# Patient Record
Sex: MALE
[De-identification: names, ages, dates, MRNs, and addresses within clinical notes are randomized per-mention and may not be internally consistent; named-entity substitution may affect disease eponyms.]

---

## 2017-02-01 ENCOUNTER — APPOINTMENT (OUTPATIENT)
Dept: OTOLARYNGOLOGY | Facility: CLINIC | Age: 27
End: 2017-02-01

## 2019-07-23 ENCOUNTER — APPOINTMENT (OUTPATIENT)
Dept: OTOLARYNGOLOGY | Facility: CLINIC | Age: 29
End: 2019-07-23
Payer: COMMERCIAL

## 2019-07-23 VITALS
DIASTOLIC BLOOD PRESSURE: 74 MMHG | HEART RATE: 71 BPM | WEIGHT: 130 LBS | SYSTOLIC BLOOD PRESSURE: 120 MMHG | HEIGHT: 67 IN | BODY MASS INDEX: 20.4 KG/M2

## 2019-07-23 DIAGNOSIS — Z78.9 OTHER SPECIFIED HEALTH STATUS: ICD-10-CM

## 2019-07-23 PROCEDURE — 31231 NASAL ENDOSCOPY DX: CPT

## 2019-07-23 PROCEDURE — 99204 OFFICE O/P NEW MOD 45 MIN: CPT | Mod: 25

## 2019-07-23 NOTE — HISTORY OF PRESENT ILLNESS
[de-identified] : PAtient has had recurrent sinus infections over the last few years. He has frequently been on antibiotics over the years and once he getw sick he has issues getting healthy again as he cannot get rid of mucus. He gets pressure in the face all the time especially on the left. He wakes up at night gasping for air as he cannot breathe through his nose. About 6 months ago everything was worse where he had pain in the teeth on the upper left isde and his left side of his face was severely dull aching pressure. He was taking tylenol and advil all the time with no benefit at that time. That severe pressure has improved but he still cant sleep at night as a result. he has used medicated nasal sprays over the last few months with minor benefit if any. he thinsk he has seasonal allergies as well btu has never been tested. He wkes up each morning and has to blow his nose frequently. He has frequent sore throat as a result of mout breathing but by the end of the day it is better.

## 2019-07-23 NOTE — REVIEW OF SYSTEMS
[Nasal Congestion] : nasal congestion [Sinus Pressure] : sinus pressure [Sinus Pain] : sinus pain [Negative] : Endocrine

## 2019-07-23 NOTE — ASSESSMENT
[FreeTextEntry1] : Patient a long history of recurrent left sided sinus pain and facial pain been seen by another otolaryngologist CAT scan was performed and reviewed shows total opacification of his left maxillary sinus as well as a severely deviated nasal septum. Patient is strongly indicated for intervention sinus surgery and septoplasty turbinoplasty all with image guided system this and benefits were discussed wants to proceed in the near future. Some consideration for possible rhinoplasty was also given understands that that be an adequate pocket expense for that. Patient wants to consider proceeding with a patulous we'll get the oral and try to get him approved by his insurance company.

## 2019-10-27 ENCOUNTER — MOBILE ON CALL (OUTPATIENT)
Age: 29
End: 2019-10-27

## 2019-10-28 ENCOUNTER — OUTPATIENT (OUTPATIENT)
Dept: OUTPATIENT SERVICES | Facility: HOSPITAL | Age: 29
LOS: 1 days | End: 2019-10-28

## 2019-10-28 VITALS
OXYGEN SATURATION: 99 % | TEMPERATURE: 97 F | DIASTOLIC BLOOD PRESSURE: 64 MMHG | WEIGHT: 128.09 LBS | RESPIRATION RATE: 16 BRPM | HEIGHT: 66.5 IN | HEART RATE: 62 BPM | SYSTOLIC BLOOD PRESSURE: 110 MMHG

## 2019-10-28 DIAGNOSIS — Z98.890 OTHER SPECIFIED POSTPROCEDURAL STATES: Chronic | ICD-10-CM

## 2019-10-28 DIAGNOSIS — J32.0 CHRONIC MAXILLARY SINUSITIS: ICD-10-CM

## 2019-10-28 LAB
ALBUMIN SERPL ELPH-MCNC: 4.9 G/DL — SIGNIFICANT CHANGE UP (ref 3.3–5)
ALP SERPL-CCNC: 58 U/L — SIGNIFICANT CHANGE UP (ref 40–120)
ALT FLD-CCNC: 20 U/L — SIGNIFICANT CHANGE UP (ref 4–41)
ANION GAP SERPL CALC-SCNC: 15 MMO/L — HIGH (ref 7–14)
AST SERPL-CCNC: 28 U/L — SIGNIFICANT CHANGE UP (ref 4–40)
BILIRUB SERPL-MCNC: 2.5 MG/DL — HIGH (ref 0.2–1.2)
BUN SERPL-MCNC: 8 MG/DL — SIGNIFICANT CHANGE UP (ref 7–23)
CALCIUM SERPL-MCNC: 10.3 MG/DL — SIGNIFICANT CHANGE UP (ref 8.4–10.5)
CHLORIDE SERPL-SCNC: 101 MMOL/L — SIGNIFICANT CHANGE UP (ref 98–107)
CO2 SERPL-SCNC: 27 MMOL/L — SIGNIFICANT CHANGE UP (ref 22–31)
CREAT SERPL-MCNC: 0.82 MG/DL — SIGNIFICANT CHANGE UP (ref 0.5–1.3)
GLUCOSE SERPL-MCNC: 82 MG/DL — SIGNIFICANT CHANGE UP (ref 70–99)
HCT VFR BLD CALC: 50.1 % — HIGH (ref 39–50)
HGB BLD-MCNC: 16.5 G/DL — SIGNIFICANT CHANGE UP (ref 13–17)
MCHC RBC-ENTMCNC: 28.1 PG — SIGNIFICANT CHANGE UP (ref 27–34)
MCHC RBC-ENTMCNC: 32.9 % — SIGNIFICANT CHANGE UP (ref 32–36)
MCV RBC AUTO: 85.2 FL — SIGNIFICANT CHANGE UP (ref 80–100)
NRBC # FLD: 0 K/UL — SIGNIFICANT CHANGE UP (ref 0–0)
PLATELET # BLD AUTO: 266 K/UL — SIGNIFICANT CHANGE UP (ref 150–400)
PMV BLD: 10.6 FL — SIGNIFICANT CHANGE UP (ref 7–13)
POTASSIUM SERPL-MCNC: 4.5 MMOL/L — SIGNIFICANT CHANGE UP (ref 3.5–5.3)
POTASSIUM SERPL-SCNC: 4.5 MMOL/L — SIGNIFICANT CHANGE UP (ref 3.5–5.3)
PROT SERPL-MCNC: 7.7 G/DL — SIGNIFICANT CHANGE UP (ref 6–8.3)
RBC # BLD: 5.88 M/UL — HIGH (ref 4.2–5.8)
RBC # FLD: 12.5 % — SIGNIFICANT CHANGE UP (ref 10.3–14.5)
SODIUM SERPL-SCNC: 143 MMOL/L — SIGNIFICANT CHANGE UP (ref 135–145)
WBC # BLD: 5.49 K/UL — SIGNIFICANT CHANGE UP (ref 3.8–10.5)
WBC # FLD AUTO: 5.49 K/UL — SIGNIFICANT CHANGE UP (ref 3.8–10.5)

## 2019-10-28 NOTE — H&P PST ADULT - MUSCULOSKELETAL
details… normal strength/ROM intact/no joint swelling/no joint erythema/no joint warmth/no calf tenderness detailed exam

## 2019-10-28 NOTE — H&P PST ADULT - NSICDXPROBLEM_GEN_ALL_CORE_FT
PROBLEM DIAGNOSES  Problem: Chronic maxillary sinusitis  Assessment and Plan: Pt scheduled for surgery on 11/6/19.   Pre-op instructions provided. Pt verbalized understanding.   Pepcid provided for GI prophylaxis.

## 2019-10-28 NOTE — H&P PST ADULT - NSICDXPASTSURGICALHX_GEN_ALL_CORE_FT
PAST SURGICAL HISTORY:  H/O inguinal hernia repair bilateral 1990    History of strabismus surgery 2008, 2017

## 2019-10-28 NOTE — H&P PST ADULT - NSICDXFAMILYHX_GEN_ALL_CORE_FT
FAMILY HISTORY:  Father  Still living? Unknown  FH: HTN (hypertension), Age at diagnosis: Age Unknown

## 2019-10-28 NOTE — H&P PST ADULT - HISTORY OF PRESENT ILLNESS
29 year old male with c/o chronic sinusitis presents today for presurgical evaluation for ... 29 year old male with c/o chronic sinusitis presents today for presurgical evaluation for Septoplasty, Bilateral Turbinoplasty, Functional Endoscopic Sinus Surgery (Bilateral Maxillary, Bilateral Ethmoid) scheduled on 11/6/19.

## 2019-10-29 ENCOUNTER — APPOINTMENT (OUTPATIENT)
Dept: OTOLARYNGOLOGY | Facility: CLINIC | Age: 29
End: 2019-10-29
Payer: COMMERCIAL

## 2019-10-29 VITALS
WEIGHT: 130 LBS | DIASTOLIC BLOOD PRESSURE: 66 MMHG | HEART RATE: 66 BPM | HEIGHT: 67 IN | SYSTOLIC BLOOD PRESSURE: 120 MMHG | BODY MASS INDEX: 20.4 KG/M2

## 2019-10-29 DIAGNOSIS — J32.0 CHRONIC MAXILLARY SINUSITIS: ICD-10-CM

## 2019-10-29 PROCEDURE — 99214 OFFICE O/P EST MOD 30 MIN: CPT | Mod: 25

## 2019-10-29 PROCEDURE — 31231 NASAL ENDOSCOPY DX: CPT

## 2019-10-29 NOTE — PHYSICAL EXAM
[Nasal Endoscopy Performed] : nasal endoscopy was performed, see procedure section for findings [Midline] : trachea located in midline position [Normal] : no rashes [] : septum deviated to the right [de-identified] : deflection of the nasal bridge to the right

## 2019-10-29 NOTE — HISTORY OF PRESENT ILLNESS
[de-identified] : PAtient was just in [place] for about 3 weeks and has been doing well. He has been doing the flonase since the last visit. he is due for his surgery in 2 weeks. He pierson snot have any sinus pressure or recent need for antibiotics. He does not think that he has any seasonal allergies. He does not have any current ear pressure  or pain.

## 2019-10-29 NOTE — ASSESSMENT
[FreeTextEntry1] : Patient scheduled for a separate stitches coming week here for reevaluation endoscopically is deviated septum was once again confirmed is also considering the possibility of some change in the appearance of his nose specifically to straighten out his nose and possibly a small graft to camouflage an irregularity of the distal left lower lateral cartilage. He understands his an out-of-pocket expense if he wants to do that photos were taken the wound let us known prior to surgery on November 6.

## 2019-11-04 ENCOUNTER — MEDICATION RENEWAL (OUTPATIENT)
Age: 29
End: 2019-11-04

## 2019-11-04 RX ORDER — ACETAMINOPHEN AND CODEINE 300; 30 MG/1; MG/1
300-30 TABLET ORAL EVERY 6 HOURS
Qty: 20 | Refills: 0 | Status: ACTIVE | COMMUNITY
Start: 2019-11-04 | End: 1900-01-01

## 2019-11-04 RX ORDER — AZITHROMYCIN 250 MG/1
250 TABLET, FILM COATED ORAL
Qty: 1 | Refills: 0 | Status: ACTIVE | COMMUNITY
Start: 2019-11-04 | End: 1900-01-01

## 2019-11-05 ENCOUNTER — TRANSCRIPTION ENCOUNTER (OUTPATIENT)
Age: 29
End: 2019-11-05

## 2019-11-06 ENCOUNTER — OUTPATIENT (OUTPATIENT)
Dept: OUTPATIENT SERVICES | Facility: HOSPITAL | Age: 29
LOS: 1 days | Discharge: HOME | End: 2019-11-06
Payer: COMMERCIAL

## 2019-11-06 ENCOUNTER — APPOINTMENT (OUTPATIENT)
Dept: OTOLARYNGOLOGY | Facility: AMBULATORY SURGERY CENTER | Age: 29
End: 2019-11-06

## 2019-11-06 ENCOUNTER — RESULT REVIEW (OUTPATIENT)
Age: 29
End: 2019-11-06

## 2019-11-06 VITALS
SYSTOLIC BLOOD PRESSURE: 120 MMHG | HEART RATE: 76 BPM | DIASTOLIC BLOOD PRESSURE: 76 MMHG | OXYGEN SATURATION: 100 % | WEIGHT: 128.09 LBS | RESPIRATION RATE: 16 BRPM | HEIGHT: 66.5 IN | TEMPERATURE: 98 F

## 2019-11-06 VITALS
TEMPERATURE: 98 F | HEART RATE: 83 BPM | DIASTOLIC BLOOD PRESSURE: 90 MMHG | OXYGEN SATURATION: 99 % | RESPIRATION RATE: 16 BRPM | SYSTOLIC BLOOD PRESSURE: 144 MMHG

## 2019-11-06 DIAGNOSIS — J32.0 CHRONIC MAXILLARY SINUSITIS: ICD-10-CM

## 2019-11-06 DIAGNOSIS — Z98.890 OTHER SPECIFIED POSTPROCEDURAL STATES: Chronic | ICD-10-CM

## 2019-11-06 PROCEDURE — 88304 TISSUE EXAM BY PATHOLOGIST: CPT | Mod: 26

## 2019-11-06 PROCEDURE — 88305 TISSUE EXAM BY PATHOLOGIST: CPT | Mod: 26

## 2019-11-06 PROCEDURE — 88311 DECALCIFY TISSUE: CPT | Mod: 26

## 2019-11-06 PROCEDURE — 31267 ENDOSCOPY MAXILLARY SINUS: CPT | Mod: 50,59

## 2019-11-06 PROCEDURE — 30140 RESECT INFERIOR TURBINATE: CPT | Mod: 50,59

## 2019-11-06 PROCEDURE — 30520 REPAIR OF NASAL SEPTUM: CPT | Mod: 59

## 2019-11-06 PROCEDURE — 31255 NSL/SINS NDSC W/TOT ETHMDCT: CPT | Mod: 50

## 2019-11-06 PROCEDURE — 61782 SCAN PROC CRANIAL EXTRA: CPT

## 2019-11-06 RX ORDER — AZITHROMYCIN 500 MG/1
1 TABLET, FILM COATED ORAL
Qty: 6 | Refills: 0
Start: 2019-11-06 | End: 2019-11-10

## 2019-11-06 RX ORDER — ACETAMINOPHEN WITH CODEINE 300MG-30MG
1 TABLET ORAL
Qty: 20 | Refills: 0
Start: 2019-11-06 | End: 2019-11-10

## 2019-11-06 NOTE — ASU DISCHARGE PLAN (ADULT/PEDIATRIC) - ASU DC SPECIAL INSTRUCTIONSFT
Slow stream during urinating can be normal especially in males after general anesthesia. Complete inability to urinate is not normal, however, and you should alert your doctor as soon as possible.

## 2019-11-06 NOTE — ASU DISCHARGE PLAN (ADULT/PEDIATRIC) - CALL YOUR DOCTOR IF YOU HAVE ANY OF THE FOLLOWING:
Bleeding that does not stop/Fever greater than (need to indicate Fahrenheit or Celsius)/Unable to urinate

## 2019-11-06 NOTE — ASU PREOPERATIVE ASSESSMENT, ADULT (IPARK ONLY) - PERIPHERAL IV: INSERTION DATE
Left message for patient to call clinic, please give message from Sarah and Specialty Scheduling number [phone] for OB/Gyn appointment.    Fauzia Farmer XRO/     06-Nov-2019

## 2019-11-06 NOTE — ASU DISCHARGE PLAN (ADULT/PEDIATRIC) - CARE PROVIDER_API CALL
Dmitriy Stokes (MD)  Facial Plastic and Reconstructive Surgery; Otolaryngology  [address] [place] [address], [facility] [address]  [place] [address]  Phone: [phone]  Fax: [phone]  Follow Up Time:

## 2019-11-07 ENCOUNTER — APPOINTMENT (OUTPATIENT)
Dept: OTOLARYNGOLOGY | Facility: CLINIC | Age: 29
End: 2019-11-07
Payer: COMMERCIAL

## 2019-11-07 VITALS
DIASTOLIC BLOOD PRESSURE: 72 MMHG | SYSTOLIC BLOOD PRESSURE: 120 MMHG | HEART RATE: 96 BPM | WEIGHT: 130 LBS | HEIGHT: 67 IN | BODY MASS INDEX: 20.4 KG/M2

## 2019-11-07 PROCEDURE — 31237 NSL/SINS NDSC SURG BX POLYPC: CPT | Mod: 50,58

## 2019-11-07 PROCEDURE — 99024 POSTOP FOLLOW-UP VISIT: CPT

## 2019-11-07 NOTE — PHYSICAL EXAM
[Nasal Endoscopy Performed] : nasal endoscopy was performed, see procedure section for findings [Midline] : trachea located in midline position [Normal] : orientation to person, place, and time: normal [de-identified] : cast intact over the nasal bridge

## 2019-11-07 NOTE — PHYSICAL EXAM
[Nasal Endoscopy Performed] : nasal endoscopy was performed, see procedure section for findings [Midline] : trachea located in midline position [Normal] : orientation to person, place, and time: normal [de-identified] : cast intact over the nasal bridge

## 2019-11-07 NOTE — HISTORY OF PRESENT ILLNESS
[de-identified] : PAtient si 1 day s/p septopalsty and FESS and is having moderate nasal pressure bilaterally. He was bleeding overnight and had to  change the dressing several times overnight. he does not have any issues with fevers or chills. He started the antibitoics last night with some soup. He did take the pain medication as well for pain control

## 2019-11-07 NOTE — HISTORY OF PRESENT ILLNESS
[de-identified] : PAtient si 1 day s/p septopalsty and FESS and is having moderate nasal pressure bilaterally. He was bleeding overnight and had to  change the dressing several times overnight. he does not have any issues with fevers or chills. He started the antibitoics last night with some soup. He did take the pain medication as well for pain control

## 2019-11-11 ENCOUNTER — APPOINTMENT (OUTPATIENT)
Dept: OTOLARYNGOLOGY | Facility: CLINIC | Age: 29
End: 2019-11-11
Payer: COMMERCIAL

## 2019-11-11 VITALS
HEIGHT: 67 IN | SYSTOLIC BLOOD PRESSURE: 114 MMHG | BODY MASS INDEX: 20.4 KG/M2 | WEIGHT: 130 LBS | DIASTOLIC BLOOD PRESSURE: 73 MMHG | HEART RATE: 100 BPM

## 2019-11-11 DIAGNOSIS — J34.2 DEVIATED NASAL SEPTUM: ICD-10-CM

## 2019-11-11 PROCEDURE — 99024 POSTOP FOLLOW-UP VISIT: CPT

## 2019-11-11 RX ORDER — MOMETASONE FUROATE 1 MG/G
0.1 CREAM TOPICAL
Qty: 45 | Refills: 0 | Status: ACTIVE | COMMUNITY
Start: 2019-09-11

## 2019-11-11 RX ORDER — ERYTHROMYCIN 20 MG/G
2 GEL TOPICAL
Qty: 30 | Refills: 0 | Status: ACTIVE | COMMUNITY
Start: 2019-09-17

## 2019-11-11 NOTE — HISTORY OF PRESENT ILLNESS
[de-identified] : 30yo male s/p rhinoplasty/septo/FESS on 11/6 here for eval secondary to right nasal bleeding over weekend with significant nasal obstruction and pressure. unable to sleep bc cannot breathe through nose and feeling pressure.

## 2019-11-11 NOTE — PHYSICAL EXAM
[de-identified] : bloody secretions and clot filling right nasal passage - this was suctioned clear and afrin sprayed bilaterally; left side with edema but right able to be cleared so patient could breathe [de-identified] : splint in place

## 2019-11-11 NOTE — ASSESSMENT
[FreeTextEntry1] : s/p rhinoplasty/septo/turbs:\par - some self-limited right bleeding over weekend but with significant nasal congestion that was addressed today by suctioning out nasal passage\par - pt more comfortable; advised can use sudafed if he feels increased pressure\par - cont nasal saline spray\par - f/u with Dr. Stokes tomorrow as scheduled

## 2019-11-12 ENCOUNTER — APPOINTMENT (OUTPATIENT)
Dept: OTOLARYNGOLOGY | Facility: CLINIC | Age: 29
End: 2019-11-12
Payer: COMMERCIAL

## 2019-11-12 VITALS
BODY MASS INDEX: 20.4 KG/M2 | WEIGHT: 130 LBS | HEIGHT: 67 IN | DIASTOLIC BLOOD PRESSURE: 69 MMHG | SYSTOLIC BLOOD PRESSURE: 121 MMHG | HEART RATE: 84 BPM

## 2019-11-12 LAB — SURGICAL PATHOLOGY STUDY: SIGNIFICANT CHANGE UP

## 2019-11-12 PROCEDURE — 31237 NSL/SINS NDSC SURG BX POLYPC: CPT | Mod: 50,58

## 2019-11-12 PROCEDURE — 99024 POSTOP FOLLOW-UP VISIT: CPT

## 2019-11-12 NOTE — ASSESSMENT
[FreeTextEntry1] : patient follows up 5 days status post septoplasty with turbs and rhino. Patient has finished taking the antibiotics as prescribed and is taking pain medication as needed. nasal splints were removed from the bilateral nasal cavities without issue. Patient was debrided bilaterally with rigid suction as a result of nasal crusting. Tip dressing was replaced and should be changed as needed until followup. Patient should continue to avoid nose blowing, heavy lifting or exercising, and should continue a regimen of over the counter nasal saline spray for moisturization of the nasal cavities. Patient will followup in 1 week for further debridement.

## 2019-11-12 NOTE — HISTORY OF PRESENT ILLNESS
[de-identified] : Patient is about 5 days s/p septoplasty and FESS and is feeling better today. He ended up coming in yesterday to be suctioned out as a result of having severe nasal congestion and crusting. he does not have any facial pressure or pain and is not having any issues with bleeding for the lsat few days. He finished the antibiotics as prescribed

## 2019-11-21 ENCOUNTER — APPOINTMENT (OUTPATIENT)
Dept: OTOLARYNGOLOGY | Facility: CLINIC | Age: 29
End: 2019-11-21
Payer: COMMERCIAL

## 2019-11-21 VITALS
BODY MASS INDEX: 20.4 KG/M2 | HEIGHT: 67 IN | DIASTOLIC BLOOD PRESSURE: 76 MMHG | WEIGHT: 130 LBS | SYSTOLIC BLOOD PRESSURE: 123 MMHG | HEART RATE: 72 BPM

## 2019-11-21 PROCEDURE — 99024 POSTOP FOLLOW-UP VISIT: CPT

## 2019-11-21 PROCEDURE — 31237 NSL/SINS NDSC SURG BX POLYPC: CPT | Mod: 50,58

## 2019-11-21 NOTE — HISTORY OF PRESENT ILLNESS
[de-identified] : Patient is 2 weeks s/p septoplasty and is feeling like he is still minor congestion on occasion. he does not have any facial pain or pressure and is nto having any issues with bleeding from the nose. He does not have any facial pressure or pain and is overall feeling like there is pressure in the forehead that is moderate. He did take sudafed that helped but then he got very dry.

## 2019-11-21 NOTE — ASSESSMENT
[FreeTextEntry1] : Patient to status post nasal surgery doing great looks great very happy having some difficulty breathing however this is due to crusting which was endoscopically debrided bilaterally resulting in excellent breathing for him he is very happy he will follow up and see us in one month.

## 2019-12-17 ENCOUNTER — APPOINTMENT (OUTPATIENT)
Dept: OTOLARYNGOLOGY | Facility: CLINIC | Age: 29
End: 2019-12-17
Payer: COMMERCIAL

## 2019-12-17 VITALS
WEIGHT: 130 LBS | SYSTOLIC BLOOD PRESSURE: 121 MMHG | DIASTOLIC BLOOD PRESSURE: 69 MMHG | HEART RATE: 85 BPM | HEIGHT: 67 IN | BODY MASS INDEX: 20.4 KG/M2

## 2019-12-17 PROCEDURE — 31237 NSL/SINS NDSC SURG BX POLYPC: CPT | Mod: 50,58

## 2019-12-17 PROCEDURE — 99024 POSTOP FOLLOW-UP VISIT: CPT

## 2019-12-17 NOTE — ASSESSMENT
[FreeTextEntry1] : Patient follows up 6 weeks status post surgery doing great feeling much better no longer has any significant congestion still feel somewhat nasal reassuring with time double resolved feels a little dry of encouraged him to use a YR gel he looked great he is very happy photos were taken and followup and see us in one month. He understands that his left nasal cavity will never be as good as his right nasal cavity and for structural reasons and we were very conservative up in the nasal valve region which he understands his breathing tremendously better and both sides actually and is very happy.

## 2019-12-17 NOTE — HISTORY OF PRESENT ILLNESS
[de-identified] : PAtient is about 6 weeks after his septoplasty and is doing his daily sinus rinses btu still feels nasally congested and dry bilaterally. He has moderate dryness in both nasal cavities but feels like the dryness interrupts his breathing bilaterally. he feels that he sounds nasally when he speaks. He does not have any issues with runny nose bilaterally. he deneis any bleeding from the nose as well

## 2020-01-28 ENCOUNTER — APPOINTMENT (OUTPATIENT)
Dept: OTOLARYNGOLOGY | Facility: CLINIC | Age: 30
End: 2020-01-28
Payer: COMMERCIAL

## 2020-01-28 VITALS
HEIGHT: 67 IN | SYSTOLIC BLOOD PRESSURE: 121 MMHG | HEART RATE: 72 BPM | BODY MASS INDEX: 20.4 KG/M2 | DIASTOLIC BLOOD PRESSURE: 76 MMHG | WEIGHT: 130 LBS

## 2020-01-28 DIAGNOSIS — J34.89 OTHER SPECIFIED DISORDERS OF NOSE AND NASAL SINUSES: ICD-10-CM

## 2020-01-28 DIAGNOSIS — Z98.890 OTHER SPECIFIED POSTPROCEDURAL STATES: ICD-10-CM

## 2020-01-28 PROCEDURE — 99024 POSTOP FOLLOW-UP VISIT: CPT

## 2020-01-28 NOTE — ASSESSMENT
[FreeTextEntry1] : Patient breathing better but still feels more restricted on his left side and is bothering him. On examination there is a high deflection that has persisted his breathing capacity is significantly improved but I feel can be improved more with a revision if she so desires. I told to use Breathe Right see if that will help this so I feel comfortable recommending a revision to be more aggressive in that region. If not not sure that any additional intervention would be of benefit. This was discussed with the patient at length understood he will think about it as well to follow up and see us in 2 months.

## 2020-01-28 NOTE — PHYSICAL EXAM
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

## 2020-01-28 NOTE — HISTORY OF PRESENT ILLNESS
[de-identified] : PAtient is about 2.5 months s/p septoplasty and is overall doing well but still feels like part of his nasal passage is blocked on both sides. WHile he has not had any sinus pressure or runny nose he still feels like this contributes to minor nasal congestion. He does still do the saline but he continues to feel like this obstruction has persisted towards the back. He has also been told since the surgery that his voice sounds "nasal"

## 2021-07-08 NOTE — H&P PST ADULT - RESPIRATORY
Billing Type: Third-Party Bill
Bill For Surgical Tray: no
Performing Laboratory: 0
Expected Date Of Service: 07/08/2021
detailed exam

## 2022-05-23 NOTE — REVIEW OF SYSTEMS
[Nasal Congestion] : nasal congestion [Negative] : Heme/Lymph range of motion is not limited and there is no muscle tenderness.

## 2023-09-25 RX ORDER — IBUPROFEN 200 MG
2 TABLET ORAL
Qty: 0 | Refills: 0 | DISCHARGE

## 2023-09-25 RX ORDER — FLUTICASONE PROPIONATE 50 MCG
1 SPRAY, SUSPENSION NASAL
Qty: 0 | Refills: 0 | DISCHARGE

## 2023-09-25 RX ORDER — ACETAMINOPHEN 500 MG
2 TABLET ORAL
Qty: 0 | Refills: 0 | DISCHARGE

## 2024-02-19 NOTE — H&P PST ADULT - SPO2 (%)
Follow up Endocrinology Visit  Initial consult on: 8/17/23  Last visit on: 1/17/23  Referred by: Stanton Miller M.D.    Patient was presented for a telehealth consultation via secure and encrypted videoconferencing technology.    Location of Provider - office  Location of Patient - home  Patient Consent - yes  Platform used - Zoom  Total time -  min    Chief complaint:  Prabhakar Sierra, 74 y.o., male, who is here for follow up for T2DM management. Here with his wife - Heidy    Interval history:   -   Prior notes:  - overall doing well  - unfortunately, he couldn't tolerate Ozempic - lost his appetite, was very apathic as per patient's wife  - has poor dentition, needs dental work  - no side effects with Jardiance, still in process of getting assistance program for the medication  1/17/23  - had hospitalization in 12/2023 due to weakness, leading to fall, complicated by L1 compression fracture  - today is first day home  - Reclast infusion was delayed due to hospitalization  - in the hospital was given short acting insulin prn, but lately he didn't require one  - concerns of decrease BMD with pitoglitazone  - reviewed most recent labs    Current therapy:  - change dose of Synjardy 25/1000 daily -> 12.5/1000 bid.   - start on Tradjenta 5 mg daily  - stop Pioglitazone  - make sure patient is well hydrated    Trijardy??    Tolerates medications: well  Compliant: yes    Prior used medications:   Rybelsus -> stopped too expensive  Ozempic -> loss of appetite, apathy    Other important medications:  ASA 81  Atorvastatin 80 mg  Benazepril 40 mg  Duloxetine 60 mg  Metoprolol tartrate 25 mg    BG control:  SBGM x 3  FBGs - 183 - 300  Bgs during the day - 73 - 373, majority at mid 100s    Prior:  CGM type - Elena 3  Period - 10/4/23 - 10/17/23   Data were reviewed and discussed with the patient  Active time - 96%  ABG - 185 mg/dl  GV - 19.2%  GMI - 7.7%  TIR - 44%  TAR - high - 52%, very high - 4%  TBR - 0%      Hypoglycemic episodes:  Hypoglycemic symptoms: NA  Hypoglycemic unawareness: NA  Treatment: NA  Severe hypoglycemia: NA  Has medical bracelet/necklace:NA  Has glucagon emergency kit: NA    DM history:  - diagnosed 20+, on blood screen, weight at the time of the diagnosis - 260 lb (BMI = 37.3 kg/m2)   - hospitalizations for DKA/HHS/severe hyperglycemia/severe hypoglycemia in the past: none  - prior therapy:  Rybelsus, Jardiance - stopped due to being expensive, denies any side effects except unpleasant mouth taste with Rybelsus  - known DM complications: peripheral neuropathy  - latest HbA1C 10% in 8/2023  - pertinent PMHx:   -- obesity, dyslipidemia, prostate Cr, s/p multiple routes of treatment, currently in remission, GERD, HTN, depression, ED, alcohol dependence in the remission, Alzheimer's dementia,  NPH, thoracic compression fracture, PSVT  -- denies  CAD/PAD/CHF/ CVA  Hospital admission 12/28/22 - 1/5/23:  - presented with back pain, CT showed T11, L2, L3 compression fractures, he considered to be poor candidate for kyphoplasty -> prolonged rehab recovery  - reports weight loss which he associated with decreased appetite due to not tasty food in hospital and rehab    Current Medications:No current facility-administered medications for this visit.  No current outpatient medications on file.    Facility-Administered Medications Ordered in Other Visits:     LORazepam (Ativan) 2 MG/ML oral conc 0.26-0.5 mg, 0.26-0.5 mg, Oral, Q2HRS PRN, Rin Franklin, A.P.R.N.    senna-docusate (Pericolace Or Senokot S) 8.6-50 MG per tablet 2 Tablet, 2 Tablet, Oral, BID **AND** lactulose 20 GM/30ML solution 30 mL, 30 mL, Oral, Q24HRS PRN **AND** bisacodyl (Dulcolax) suppository 10 mg, 10 mg, Rectal, Q24HRS PRN **AND** sodium phosphate (Fleet) enema 133 mL, 1 Each, Rectal, Once PRN, Rin Franklin A.P.R.N.    acetaminophen (Tylenol) tablet 650 mg, 650 mg, Oral, Q4HRS PRN, TORIE [email or web address]    acetaminophen  (Tylenol) suppository 650 mg, 650 mg, Rectal, Q4HRS PRN, Rin M Whitehorn, A.P.R.N.    ondansetron (Zofran ODT) dispertab 4 mg, 4 mg, Oral, Q6HRS PRN, Rin M Whitehorn, A.P.R.N.    buPROPion (Wellbutrin) tablet 150 mg, 150 mg, Oral, BID, Rin M Whitehorn, A.P.R.N., 150 mg at 02/18/24 2030    DULoxetine (Cymbalta) capsule 60 mg, 60 mg, Oral, BID, Rin M Whitehorn, A.P.R.N., 60 mg at 02/18/24 2028    metoprolol tartrate (Lopressor) tablet 12.5 mg, 12.5 mg, Oral, BID, Rin M Whitehorn, A.P.R.N., 12.5 mg at 02/18/24 2029    omeprazole (PriLOSEC) capsule 20 mg, 20 mg, Oral, BID, Rin M Whitehorn, A.P.R.N., 20 mg at 02/18/24 2029    HYDROcodone-acetaminophen (Norco) 5-325 MG per tablet 1 Tablet, 1 Tablet, Oral, Q4HRS PRN, Rin M Whitehorn, A.P.R.N.    SITagliptin (Januvia) tablet 100 mg, 100 mg, Oral, DAILY, Rin M Whitehorn, A.P.R.N., 100 mg at 02/18/24 1419    metFORMIN (Glucophage) tablet 1,000 mg, 1,000 mg, Oral, BID WITH MEALS, Rin M Whitehorn, A.P.R.N., 1,000 mg at 02/18/24 1751    lidocaine (Asperflex) 4 % patch 1 Patch, 1 Patch, Transdermal, Q24HR, Rin M Whitehorn, A.P.R.N.    PHYSICAL EXAM:   Vital signs: There were no vitals taken for this visit.  GENERAL: Well-developed, well-nourished  in no apparent distress.  Walks with walker. Appropriately answers the questions but most of the history is obtained from his wife.   CARDIOVASCULAR: Regular rate and rhythm.   LUNGS: No dyspnea  ABDOMEN: Soft, nondistended  EXTREMITIES: No clubbing, cyanosis, or edema.   NEUROLOGICAL: Cranial nerves II-XII are grossly intact. Replies with simple answers yes and no, looking at his wife to answer more complex questions.   SKIN: No rashes, lesions. Turgor is normal.  FOOT: Decreased sensation to monofilament testing on L foot, normal pulses, no ulcers, dry skin, severe callus with skin crack and bleeding, but no surrounding inflammation.      Labs:  MOST RECENT LABS:  - reviewed    PRIOR PERTINENT LABS:  -  reviewed        HbA1C/BG/Hb:  Lab Results   Component Value Date/Time    HBA1C 10 (A) 08/07/2023 0922    HBA1C 8.1 (H) 03/28/2023 1519    HBA1C 8.4 (H) 11/04/2022 0956    HBA1C 8.1 (H) 10/18/2022 1123    HBA1C 7.6 (A) 03/07/2022 1327    AVGLUC 186 03/28/2023 1519    AVGLUC 194 11/04/2022 0956    AVGLUC 186 10/18/2022 1123    AVGLUC 174 10/26/2021 1059    AVGLUC 200 07/26/2021 0832     Lab Results   Component Value Date/Time    HEMOGLOBIN 14.0 02/01/2024 10:05 AM    HEMOGLOBIN 13.5 (L) 01/19/2024 11:35 AM    HEMOGLOBIN 12.4 (L) 12/21/2023 08:10 AM     Lab Results   Component Value Date/Time    GLUCOSE 188 (H) 02/01/2024 10:05 AM    GLUCOSE 169 (H) 01/19/2024 11:35 AM    GLUCOSE 142 (H) 12/21/2023 08:10 AM    GLUCOSE 167 (H) 12/20/2023 03:32 AM    GLUCOSE 102 (H) 12/19/2023 11:55 AM     Kidney function/MACR:  Lab Results   Component Value Date/Time    CREATININE 0.53 02/01/2024 10:05 AM    CREATININE 0.68 01/19/2024 11:35 AM     Lab Results   Component Value Date/Time    MALBCRT 108 (H) 03/28/2023 02:28 PM    MICROALBUR 3.7 03/28/2023 02:28 PM      LFTs:  Lab Results   Component Value Date/Time    ASTSGOT 17 02/01/2024 10:05 AM    ASTSGOT 26 12/19/2023 11:55 AM    ASTSGOT 10 (L) 11/25/2023 07:09 PM    ALTSGPT 13 02/01/2024 10:05 AM    ALTSGPT 13 12/19/2023 11:55 AM    ALTSGPT 11 11/25/2023 07:09 PM     Lipid panel:  Lab Results   Component Value Date/Time    TRIGLYCERIDE 205 (H) 02/01/2024 10:05 AM    TRIGLYCERIDE 148 12/20/2023 03:32 AM    TRIGLYCERIDE 211 (H) 08/15/2023 08:46 AM    HDL 35 (A) 02/01/2024 10:05 AM    HDL 35 (A) 12/20/2023 03:32 AM    HDL 47 08/15/2023 08:46 AM    LDL 43 02/01/2024 10:05 AM    LDL 31 12/20/2023 03:32 AM    LDL 70 08/15/2023 08:46 AM     Hypothyroidism screening:  Lab Results   Component Value Date/Time    TSHULTRASEN 0.930 03/28/2023 1519     Osteoporosis work up:          Imaging:  - reviewed  DEXA scan on 3/29/23:  Date Machine L1- L4  BMD/T-score LFN  BMD/T-score FRAX Rx    3/29/23   GE Prodigy unit   1.443 g/cm2 / 1.9  1.079 g/cm2 / - 0.2  9.0% / 1.9%  none     ASSESSMENT/PLAN:   1. Type 2 diabetes mellitus with hyperglycemia, without long-term current use of insulin (HCC)  - duration x 20 + years  - on Metformin, Pioglitazone, Jardiance  - suboptimally controlled, HbA1C 10% (8/2023) -> 8.5% (12/2023)     Microvascular complications: nephropathy with microalbuminuria, denies peripheral neuropathy, retinopathy  Macrovascular complications: denies had 3 TIAs, denies CAD, PAD, CVA  Associated comorbidities: obesity, dyslipidemia, prostate Cr, s/p multiple routes of treatment, currently in remission, GERD, HTN, depression, ED, alcohol dependence in the remission, Alzheimer's dementia,  NPH, T11, L2, L3 compression fractures, PSVT     DM complication screening:  Labs reviewed:  MACR - 108 (+), last checked in 8/2023   Creat/GFR wnl, last checked in 8/2023  LDL - 70 - at target, last checked in 8/2023     Patient is taking statin: on Atorvastatin 80 mg  Patient is taking ASA: yes  Patient is taking ACE/ARB: on Benazepril 40 mg     Last eye exam: 11/17/23, denies DR  Last foot exam: in 10/2023 by me, noted decreased sensation in his L foot, severe callus on R foot with skin crack and bleeding but no surrounding inflammation, dry skin, no wounds.      Home medications:  Metformin 1000 mg bid  Pioglitazone 30 mg  Jardiance 25 mg    Prior used medications:   Rybelsus -> stopped too expensive, poor mouth taste  Ozempic -> loss of appetite, apathy    Discussion:  - patient with multiple comorbidities, including dementia and long-term DM  - currently on max dose of SGLT-2 inhibitor, and moderate dose of Metformin and Pioglitazone, could not tolerate GLP-1 agonist  - will increase dose of Metformin in Synjardy, start DPP-4 inhibitor (working on getting assistance program for that), stop Pioglitazone due to concerns of BMD loss  -  I would avoid MOREIRA in elderly patient  - in case of worsening of BG control  consider use of basal insulin vs adding MOREIRA    Plan:  Discussed with the patient goals for DM management:  Fasting BG 90 - 150  2 hrs postprandial  - 220  HbA1C < 7.5 - 8.0% - given current poor control, comorbidities, including dementia     Therapy adjustments:  - change dose of Synjardy 25/1000 daily -> 12.5/1000 bid.   - start on Tradjenta 5 mg daily  - stop Pioglitazone  - make sure patient is well hydrated    3.  Resume CGM use    2. Essential hypertension  - well controlled  - managed by PCP    3. Dyslipidemia  - LDL at target on high dose statin  - continue current management    4. History of T11, L2, L3 compression fractures, new L1 compression fracture      Male hypogonadism  - new compression L1 fracture with GLF  - vit D wnl  - seen dentist in 11/2023, had few of his teeth pooled out, no more plans for extensive dental work, only bridge placement  Prior notes:  - with GLF = fragility fracture = severe osteoporosis, even DEXA scan didn't show low T-score, which could be explained by compression fractures in lumbar spine  - at high risk for falls, walks with walker  - currently on vit D 4000 IU/day  - not on any pharmacologic treatment  - risk factors: advanced age, hypogonadism, secondary likely due to Rx of prostate cancer, multiple radiation treatment for the prostate cancer, unlikely bone mets of prostate cancer, as PSA is undetectable now, mild vit D deficiency, smoking history, Hx of alcohol abuse  - denies history of Ca, phosphorus, thyroid/parathryoid disorders, kidney stones, CKD, liver disease, chronic malabsorption/diarrhea, immobilization  -  Fhx of fractures/osteoporosis ?, Pioglitazone use  - bone forming agents would be contraindicated for the patient, given history of radiation exposure (contraindication for Tymlos, Forteo), Evenity is not FDA approved for osteoporosis in males in [place], also he had Hx of TIAs  - best treatment option IV Reclast, however, patient might require significant  dental work in a near future, in which case it is better to finish dental work before starting IV bisphosphonate  - testosterone therapy is likely to be contraindicated in light of Hx of prostate cancer  Plan:  Continue vit D supplementation.   Fall precautions.   Proceed with Reclast infusion.   Start calcium supplementation.     RTC: 1 mo    Total time (face-to-face and non-face-to face time): 30     Plan reviewed with the patient and agreed with plan.  All questions answered to patient's satisfaction.  Thank you kindly for allowing me to participate in the diabetes care plan for this patient.  Katie Soler MD    CC:   Stanton Miller M.D.   99

## 2025-03-07 NOTE — PACU DISCHARGE NOTE - NAUSEA/VOMITING:
Quality 226: Preventive Care And Screening: Tobacco Use: Screening And Cessation Intervention: Patient screened for tobacco use and is an ex/non-smoker Quality 431: Preventive Care And Screening: Unhealthy Alcohol Use - Screening: Patient not identified as an unhealthy alcohol user when screened for unhealthy alcohol use using a systematic screening method Detail Level: Detailed None